# Patient Record
Sex: FEMALE | Race: WHITE | NOT HISPANIC OR LATINO | URBAN - METROPOLITAN AREA
[De-identification: names, ages, dates, MRNs, and addresses within clinical notes are randomized per-mention and may not be internally consistent; named-entity substitution may affect disease eponyms.]

---

## 2016-01-13 LAB — PAP SMEAR - HIM PATIENT REPORTED: NEGATIVE

## 2017-03-27 ENCOUNTER — OFFICE VISIT (OUTPATIENT)
Dept: FAMILY MEDICINE | Facility: CLINIC | Age: 27
End: 2017-03-27

## 2017-03-27 VITALS
WEIGHT: 119 LBS | SYSTOLIC BLOOD PRESSURE: 108 MMHG | DIASTOLIC BLOOD PRESSURE: 69 MMHG | OXYGEN SATURATION: 98 % | HEIGHT: 65 IN | HEART RATE: 58 BPM | BODY MASS INDEX: 19.83 KG/M2 | TEMPERATURE: 97.7 F

## 2017-03-27 DIAGNOSIS — Z30.41 ENCOUNTER FOR SURVEILLANCE OF CONTRACEPTIVE PILLS: ICD-10-CM

## 2017-03-27 DIAGNOSIS — Z00.00 ROUTINE GENERAL MEDICAL EXAMINATION AT A HEALTH CARE FACILITY: Primary | ICD-10-CM

## 2017-03-27 RX ORDER — NORGESTIMATE AND ETHINYL ESTRADIOL 7DAYSX3 28
1 KIT ORAL DAILY
COMMUNITY
End: 2017-03-27

## 2017-03-27 RX ORDER — NORGESTIMATE AND ETHINYL ESTRADIOL 7DAYSX3 28
1 KIT ORAL DAILY
Qty: 84 TABLET | Refills: 3 | Status: SHIPPED | OUTPATIENT
Start: 2017-03-27 | End: 2018-02-08

## 2017-03-27 ASSESSMENT — PAIN SCALES - GENERAL: PAINLEVEL: NO PAIN (0)

## 2017-03-27 NOTE — NURSING NOTE
"    Chief Complaint   Patient presents with     [facility] Care     Physical     27 year old      Blood pressure 108/69, pulse 58, temperature 97.7  F (36.5  C), temperature source Oral, height 5' 5.35\" (166 cm), weight 119 lb (54 kg), SpO2 98 %. Body mass index is 19.59 kg/(m^2).    Wt Readings from Last 2 Encounters:   03/27/17 119 lb (54 kg)     BP Readings from Last 3 Encounters:   03/27/17 108/69       There is no problem list on file for this patient.      Current Outpatient Prescriptions   Medication Sig Dispense Refill     norgestim-eth estrad triphasic (TRI-ESTARYLLA) 0.18/0.215/0.25 MG-35 MCG per tablet Take 1 tablet by mouth daily         Social History   Substance Use Topics     Smoking status: Never Smoker     Smokeless tobacco: Not on file     Alcohol use Yes         Health Maintenance Due   Topic Date Due     HPV IMMUNIZATION (1 of 3 - Female 3 Dose Series) 03/13/2003     TETANUS IMMUNIZATION (SYSTEM ASSIGNED)  03/13/2010       DEEPAK HECK CMA  March 27, 2017 10:15 AM      "

## 2017-03-27 NOTE — MR AVS SNAPSHOT
After Visit Summary   3/27/2017    Lexii Oneill    MRN: [id number]           Patient Information     Date Of Birth          1990        Visit Information        Provider Department      3/27/2017 10:00 AM Elham Garcia PA-C [facility]        Today's Diagnoses     Routine general medical examination at a health care facility    -  1    Screening for malignant neoplasm of cervix        Immunization due        Encounter for surveillance of contraceptive pills          Care Instructions      Preventive Health Recommendations  Female Ages 18 to 25     Yearly exam:     See your health care provider every year in order to  o Review health changes.   o Discuss preventive care.    o Review your medicines if your doctor has prescribed any.      You should be tested each year for STDs (sexually transmitted diseases).       After age 20, talk to your provider about how often you should have cholesterol testing.      Starting at age 21, get a Pap test every three years. If you have an abnormal result, your doctor may have you test more often.      If you are at risk for diabetes, you should have a diabetes test (fasting glucose).     Shots:     Get a flu shot each year.     Get a tetanus shot every 10 years.     Consider getting the shot (vaccine) that prevents cervical cancer (Gardasil).    Nutrition:     Eat at least 5 servings of fruits and vegetables each day.    Eat whole-grain bread, whole-wheat pasta and brown rice instead of white grains and rice.    Talk to your provider about Calcium and Vitamin D.     Lifestyle    Exercise at least 150 minutes a week each week (30 minutes a day, 5 days a week). This will help you control your weight and prevent disease.    Limit alcohol to one drink per day.    No smoking.     Wear sunscreen to prevent skin cancer.    See your dentist every six months for an exam and cleaning.        Follow-ups after your visit        Who to contact     Please call  "your clinic at [phone] to:    Ask questions about your health    Make or cancel appointments    Discuss your medicines    Learn about your test results    Speak to your doctor   If you have compliments or concerns about an experience at your clinic, or if you wish to file a complaint, please contact [facility] Patient Relations at [phone] or email us at [email or web address]         Additional Information About Your Visit        Sara Campbellhart Information     Door 6 is an electronic gateway that provides easy, online access to your medical records. With Door 6, you can request a clinic appointment, read your test results, renew a prescription or communicate with your care team.     To sign up for Door 6 visit the website at [email or web address]   You will be asked to enter the access code listed below, as well as some personal information. Please follow the directions to create your username and password.     Your access code is: [email or web address]  Expires: 2017  7:45 AM     Your access code will  in 90 days. If you need help or a new code, please contact your [facility] or call [phone] for assistance.        Care EveryWhere ID     This is your Care EveryWhere ID. This could be used by other organizations to access your [place] medical records  [id number]        Your Vitals Were     Pulse Temperature Height Pulse Oximetry BMI (Body Mass Index)       58 97.7  F (36.5  C) (Oral) 5' 5.35\" (166 cm) 98% 19.59 kg/m2        Blood Pressure from Last 3 Encounters:   17 108/69    Weight from Last 3 Encounters:   17 119 lb (54 kg)              Today, you had the following     No orders found for display         Where to get your medicines      These medications were sent to [facility] [address] [place] [address] AT [address] [address]    Hours:  24-hours Phone:  " [phone]     norgestim-eth estrad triphasic 0.18/0.215/0.25 MG-35 MCG per tablet          Primary Care Provider Office Phone # Fax #    Elham Garcia PA-C [phone]       [facility] [address] [place] [address]        Thank you!     Thank you for choosing [facility]  for your care. Our goal is always to provide you with excellent care. Hearing back from our patients is one way we can continue to improve our services. Please take a few minutes to complete the written survey that you may receive in the mail after your visit with us. Thank you!             Your Updated Medication List - Protect others around you: Learn how to safely use, store and throw away your medicines at www.disposemymeds.org.          This list is accurate as of: 3/27/17 10:42 AM.  Always use your most recent med list.                   Brand Name Dispense Instructions for use    norgestim-eth estrad triphasic 0.18/0.215/0.25 MG-35 MCG per tablet    TRI-ESTARYLLA    84 tablet    Take 1 tablet by mouth daily

## 2018-02-08 DIAGNOSIS — Z30.41 ENCOUNTER FOR SURVEILLANCE OF CONTRACEPTIVE PILLS: ICD-10-CM

## 2018-02-08 RX ORDER — NORGESTIMATE AND ETHINYL ESTRADIOL 7DAYSX3 28
1 KIT ORAL DAILY
Qty: 84 TABLET | Refills: 0 | Status: SHIPPED | OUTPATIENT
Start: 2018-02-08 | End: 2018-04-30

## 2018-02-08 NOTE — TELEPHONE ENCOUNTER
Called [facility] pharmacy.  She has 1 week left of pills. No refills left.    Refilled per RN protocol.  Patient will be due in clinic on or around 3/27/18.  Placed information on prescription sig.    Leia Pdaron RN, [facility]

## 2018-04-30 DIAGNOSIS — Z30.41 ENCOUNTER FOR SURVEILLANCE OF CONTRACEPTIVE PILLS: ICD-10-CM

## 2018-04-30 RX ORDER — NORGESTIMATE AND ETHINYL ESTRADIOL 7DAYSX3 28
1 KIT ORAL DAILY
Qty: 28 TABLET | Refills: 0 | Status: SHIPPED | OUTPATIENT
Start: 2018-04-30 | End: 2018-05-25

## 2018-04-30 NOTE — TELEPHONE ENCOUNTER
Please call patient to let her know she is due for office visit for a refill on her OCP's.  Also she will be due for PAP 1/2019 and she can make appointment to have this updated as well. I will send one month supply to her pharmacy.  Brianne Garcia PA-C

## 2018-04-30 NOTE — TELEPHONE ENCOUNTER
Called pt and LVM on her mobile.  Told needs appt and number to call for appt and any questions.  Also told when due for PAP.  Pt not on [employer].    Chiara Kapoor RN  April 30, 2018 2:46 PM

## 2018-04-30 NOTE — TELEPHONE ENCOUNTER
Routing refill request to provider for review/approval because:  Samantha given x1 and patient did not follow up, please advise  Patient needs to be seen because it has been more than 1 year since last office visit.  Last seen 3/27/17.    Chiara Kapoor RN  April 30, 2018 2:00 PM

## 2018-05-25 DIAGNOSIS — Z30.41 ENCOUNTER FOR SURVEILLANCE OF CONTRACEPTIVE PILLS: ICD-10-CM

## 2018-05-25 RX ORDER — NORGESTIMATE AND ETHINYL ESTRADIOL 7DAYSX3 28
1 KIT ORAL DAILY
Qty: 28 TABLET | Refills: 0 | Status: SHIPPED | OUTPATIENT
Start: 2018-05-25 | End: 2018-06-26

## 2018-05-25 NOTE — TELEPHONE ENCOUNTER
Last office visit: 03/27/2017, no future appointment. Needs follow-up appointment.    Medication is being filled for 1 time refill only due to:  Patient needs to be seen because it has been more than one year since last visit.

## 2018-06-26 ENCOUNTER — OFFICE VISIT (OUTPATIENT)
Dept: FAMILY MEDICINE | Facility: CLINIC | Age: 28
End: 2018-06-26
Payer: COMMERCIAL

## 2018-06-26 VITALS
TEMPERATURE: 98.7 F | OXYGEN SATURATION: 98 % | HEIGHT: 65 IN | SYSTOLIC BLOOD PRESSURE: 106 MMHG | BODY MASS INDEX: 19.83 KG/M2 | HEART RATE: 83 BPM | DIASTOLIC BLOOD PRESSURE: 68 MMHG | WEIGHT: 119 LBS

## 2018-06-26 DIAGNOSIS — G43.009 MIGRAINE WITHOUT AURA AND WITHOUT STATUS MIGRAINOSUS, NOT INTRACTABLE: Primary | ICD-10-CM

## 2018-06-26 DIAGNOSIS — Z30.41 ENCOUNTER FOR SURVEILLANCE OF CONTRACEPTIVE PILLS: ICD-10-CM

## 2018-06-26 RX ORDER — CETIRIZINE HYDROCHLORIDE 10 MG/1
10 TABLET ORAL PRN
COMMUNITY

## 2018-06-26 RX ORDER — NORGESTIMATE AND ETHINYL ESTRADIOL 7DAYSX3 28
1 KIT ORAL DAILY
Qty: 84 TABLET | Refills: 1 | Status: SHIPPED | OUTPATIENT
Start: 2018-06-26 | End: 2018-12-12

## 2018-06-26 ASSESSMENT — PAIN SCALES - GENERAL: PAINLEVEL: NO PAIN (0)

## 2018-06-26 NOTE — MR AVS SNAPSHOT
After Visit Summary   6/26/2018    Lexii Oneill    MRN: [id number]           Patient Information     Date Of Birth          1990        Visit Information        Provider Department      6/26/2018 4:00 PM Elham Garcia PA-C [facility]        Today's Diagnoses     Migraine without aura and without status migrainosus, not intractable    -  1    Encounter for surveillance of contraceptive pills          Care Instructions    Return for PAP and physical in January 2019.      Keep headache diary.    For now continue on the current birth control pill.    Follow up if you have any worsening or persistent problems or concerns.    Welcome to [facility], where we are committed to the art of inspired primary care.  Thank you for choosing us to be a part of your well-being.    The clinic is open Monday through Friday, 8:00 a.m. - 5:00 p.m., and Saturdays from 8:00 a.m. - 12:00 p.m.  After-hours questions are directed to our 24-hour nurse line, which can be reached by calling the clinic at [phone].  You can also contact the clinic through Ocapi, our online patient portal.  (Please allow 1-2 business days for a response via Ocapi.)  If you are not already enrolled in Ocapi, access instructions are below.    If you need a refill on your prescription, please contact the pharmacy where you filled it, and they will contact our clinic with the details of what is needed.  If your prescription is a controlled substance, you will have a conversation with your provider to determine if you would like to  your prescription at the clinic or have it mailed to your pharmacy.  Please allow 2-3 business days for all refill requests to be handled.    We look forward to providing you with great care!  Please let me know if you have any questions.  Thank you for allowing me to participate in your care.  It was my pleasure meeting you.  Brianne Garcia PA-C          "   Follow-ups after your visit        Who to contact     Please call your clinic at [phone] to:    Ask questions about your health    Make or cancel appointments    Discuss your medicines    Learn about your test results    Speak to your doctor            Additional Information About Your Visit        VoxPop Clothinghart Information     Instant Information is an electronic gateway that provides easy, online access to your medical records. With Instant Information, you can request a clinic appointment, read your test results, renew a prescription or communicate with your care team.     To sign up for Instant Information visit the website at [email or web address]   You will be asked to enter the access code listed below, as well as some personal information. Please follow the directions to create your username and password.     Your access code is: [email or web address]  Expires: 2018  4:36 PM     Your access code will  in 90 days. If you need help or a new code, please contact your [facility] or call [phone] for assistance.        Care EveryWhere ID     This is your Care EveryWhere ID. This could be used by other organizations to access your [place] medical records  [id number]        Your Vitals Were     Pulse Temperature Height Last Period Pulse Oximetry Breastfeeding?    83 98.7  F (37.1  C) (Oral) 5' 5\" (165.1 cm) 2018 (Exact Date) 98% No    BMI (Body Mass Index)                   19.8 kg/m2            Blood Pressure from Last 3 Encounters:   18 106/68   17 108/69    Weight from Last 3 Encounters:   18 119 lb (54 kg)   17 119 lb (54 kg)              We Performed the Following     PAP Smear - HIM Patient Reported          Today's Medication Changes          These changes are accurate as of 18  4:36 PM.  If you have any questions, ask your nurse or doctor.               These medicines have changed or have updated prescriptions.        Dose/Directions    norgestim-eth estrad " triphasic 0.18/0.215/0.25 MG-35 MCG per tablet   Commonly known as:  TRI-ESTARYLLA   This may have changed:  additional instructions   Used for:  Encounter for surveillance of contraceptive pills        Dose:  1 tablet   Take 1 tablet by mouth daily   Quantity:  84 tablet   Refills:  1            Where to get your medicines      These medications were sent to [facility] [address] [place] [address] AT [address] [address]    Hours:  24-hours Phone:  [phone]     norgestim-eth estrad triphasic 0.18/0.215/0.25 MG-35 MCG per tablet                Primary Care Provider Office Phone # Fax #    Elham Garcia PA-C [phone] [address] [place] [address]        Equal Access to Services     CHILO GOMEZ : Melissa cook Sograce, waaxda luqadaha, qaybta kaalmada naldo, camelia hanley . So [facility] [phone].    ATENCIÓN: Si habla español, tiene a munson disposición servicios gratuitos de asistencia lingüística. Samra al [phone].    We comply with applicable federal civil rights laws and [place] laws. We do not discriminate on the basis of race, color, national origin, age, disability, sex, sexual orientation, or gender identity.            Thank you!     Thank you for choosing [facility]  for your care. Our goal is always to provide you with excellent care. Hearing back from our patients is one way we can continue to improve our services. Please take a few minutes to complete the written survey that you may receive in the mail after your visit with us. Thank you!             Your Updated Medication List - Protect others around you: Learn how to safely use, store and throw away your medicines at www.disposemymeds.org.          This list is accurate as of 6/26/18  4:36 PM.  Always use your most recent med list.                   Brand Name Dispense Instructions for use Diagnosis     cetirizine 10 MG tablet    zyrTEC     Take 10 mg by mouth as needed        norgestim-eth estrad triphasic 0.18/0.215/0.25 MG-35 MCG per tablet    TRI-ESTARYLLA    84 tablet    Take 1 tablet by mouth daily    Encounter for surveillance of contraceptive pills

## 2018-06-26 NOTE — NURSING NOTE
"28 year old  Chief Complaint   Patient presents with     Refill Request     refill birth control       Blood pressure 106/68, pulse 83, temperature 98.7  F (37.1  C), temperature source Oral, height 5' 5\" (165.1 cm), weight 119 lb (54 kg), last menstrual period 05/30/2018, SpO2 98 %, not currently breastfeeding. Body mass index is 19.8 kg/(m^2).  There is no problem list on file for this patient.      Wt Readings from Last 2 Encounters:   06/26/18 119 lb (54 kg)   03/27/17 119 lb (54 kg)     BP Readings from Last 3 Encounters:   06/26/18 106/68   03/27/17 108/69         Current Outpatient Prescriptions   Medication     cetirizine (ZYRTEC) 10 MG tablet     norgestim-eth estrad triphasic (TRI-ESTARYLLA) 0.18/0.215/0.25 MG-35 MCG per tablet     No current facility-administered medications for this visit.        Social History   Substance Use Topics     Smoking status: Never Smoker     Smokeless tobacco: Not on file     Alcohol use Yes      Comment: 6 per week       Health Maintenance Due   Topic Date Due     HIV SCREEN (SYSTEM ASSIGNED)  03/13/2010     PHQ-2 Q1 YR  03/27/2018       No results found for: SARA LundbergPTraeNTrae  June 26, 2018 4:17 PM    "

## 2018-06-26 NOTE — PROGRESS NOTES
SUBJECTIVE:   Lexii Oneill is a 28 year old female who presents to clinic today for the following health issues:      Migraine Follow-Up:  Never associated with aura.     Headaches symptoms:  Stable     Frequency: once monthly     Duration of headaches: 4 hours to 1 day    Able to do normal daily activities/work with migraines: Yes    Rescue/Relief medication:ibuprofen (Advil, Motrin)              Effectiveness: moderate relief    Preventative medication: None    Neurologic complications: No new stroke-like symptoms, loss of vision or speech, numbness or weakness    In the past 4 weeks, how often have you gone to Urgent Care or the emergency room because of your headaches?  0      Amount of exercise or physical activity: 2-3 days/week for an average of 45-60 minutes    Problems taking medications regularly: No    Medication side effects: none    Diet: regular (no restrictions)        Contraception:   Needs a refill of OCP. She will be due for PAP in 6 months. She denies vaginal symptoms and has no concerns for STD's.  She declines screening.      Problem list and histories reviewed & adjusted, as indicated.  Additional history: as documented    Patient Active Problem List   Diagnosis     Migraine without aura and without status migrainosus, not intractable     Encounter for surveillance of contraceptive pills     Past Surgical History:   Procedure Laterality Date     SINUS SURGERY      Polyps removed     wisdom teeth         Social History   Substance Use Topics     Smoking status: Never Smoker     Smokeless tobacco: Not on file     Alcohol use Yes      Comment: 6 per week     Family History   Problem Relation Age of Onset     Lung Cancer Paternal Grandfather      smoker     Lung Cancer Paternal Uncle      Smoker     Diabetes No family hx of      Coronary Artery Disease No family hx of      Hypertension No family hx of      Abdominal Aortic Aneurysm No family hx of          Current Outpatient Prescriptions  "  Medication Sig Dispense Refill     cetirizine (ZYRTEC) 10 MG tablet Take 10 mg by mouth as needed       norgestim-eth estrad triphasic (TRI-ESTARYLLA) 0.18/0.215/0.25 MG-35 MCG per tablet Take 1 tablet by mouth daily 84 tablet 1     Allergies   Allergen Reactions     Bactrim [Sulfamethoxazole W/Trimethoprim]      Seasonal Allergies        Reviewed and updated as needed this visit by clinical staff  Allergies  Meds  Problems       Reviewed and updated as needed this visit by Provider  Allergies  Meds  Problems         ROS:  Constitutional, HEENT, cardiovascular, pulmonary, gi and gu systems are negative, except as otherwise noted.    OBJECTIVE:     /68 (BP Location: Left arm, Patient Position: Sitting, Cuff Size: Adult Regular)  Pulse 83  Temp 98.7  F (37.1  C) (Oral)  Ht 5' 5\" (165.1 cm)  Wt 119 lb (54 kg)  LMP 05/30/2018 (Exact Date)  SpO2 98%  Breastfeeding? No  BMI 19.8 kg/m2  Body mass index is 19.8 kg/(m^2).  GENERAL: healthy, alert and no distress  NECK: no adenopathy, no asymmetry, masses, or scars and thyroid normal to palpation  RESP: lungs clear to auscultation - no rales, rhonchi or wheezes  CV: regular rate and rhythm, normal S1 S2, no S3 or S4, no murmur, click or rub, no peripheral edema and peripheral pulses strong  ABDOMEN: soft, nontender, no hepatosplenomegaly, no masses and bowel sounds normal  SKIN: no suspicious lesions or rashes  PSYCH: mentation appears normal, affect normal/bright      ASSESSMENT/PLAN:         ICD-10-CM    1. Migraine without aura and without status migrainosus, not intractable G43.009    2. Encounter for surveillance of contraceptive pills Z30.41 norgestim-eth estrad triphasic (TRI-ESTARYLLA) 0.18/0.215/0.25 MG-35 MCG per tablet       Patient Instructions   Return for PAP and physical in January 2019.      Keep headache diary.    For now continue on the current birth control pill.    Follow up if you have any worsening or persistent problems or " concerns.    Welcome to [facility], where we are committed to the art of inspired primary care.  Thank you for choosing us to be a part of your well-being.    The clinic is open Monday through Friday, 8:00 a.m. - 5:00 p.m., and Saturdays from 8:00 a.m. - 12:00 p.m.  After-hours questions are directed to our 24-hour nurse line, which can be reached by calling the clinic at [phone].  You can also contact the clinic through [employer], our online patient portal.  (Please allow 1-2 business days for a response via [employer].)  If you are not already enrolled in [employer], access instructions are below.    If you need a refill on your prescription, please contact the pharmacy where you filled it, and they will contact our clinic with the details of what is needed.  If your prescription is a controlled substance, you will have a conversation with your provider to determine if you would like to  your prescription at the clinic or have it mailed to your pharmacy.  Please allow 2-3 business days for all refill requests to be handled.    We look forward to providing you with great care!  Please let me know if you have any questions.  Thank you for allowing me to participate in your care.  It was my pleasure meeting you.  Brianne Garcia PA-C  [facility]

## 2018-06-26 NOTE — PATIENT INSTRUCTIONS
Return for PAP and physical in January 2019.      Keep headache diary.    For now continue on the current birth control pill.    Follow up if you have any worsening or persistent problems or concerns.    Welcome to [facility], where we are committed to the art of inspired primary care.  Thank you for choosing us to be a part of your well-being.    The clinic is open Monday through Friday, 8:00 a.m. - 5:00 p.m., and Saturdays from 8:00 a.m. - 12:00 p.m.  After-hours questions are directed to our 24-hour nurse line, which can be reached by calling the clinic at [phone].  You can also contact the clinic through [employer], our online patient portal.  (Please allow 1-2 business days for a response via MoonClerk.)  If you are not already enrolled in [employer], access instructions are below.    If you need a refill on your prescription, please contact the pharmacy where you filled it, and they will contact our clinic with the details of what is needed.  If your prescription is a controlled substance, you will have a conversation with your provider to determine if you would like to  your prescription at the clinic or have it mailed to your pharmacy.  Please allow 2-3 business days for all refill requests to be handled.    We look forward to providing you with great care!  Please let me know if you have any questions.  Thank you for allowing me to participate in your care.  It was my pleasure meeting you.  Brianne Garcia PA-C

## 2018-06-28 PROBLEM — Z30.41 ENCOUNTER FOR SURVEILLANCE OF CONTRACEPTIVE PILLS: Status: ACTIVE | Noted: 2018-06-28

## 2018-06-28 PROBLEM — G43.009 MIGRAINE WITHOUT AURA AND WITHOUT STATUS MIGRAINOSUS, NOT INTRACTABLE: Status: ACTIVE | Noted: 2018-06-28

## 2018-12-12 DIAGNOSIS — Z30.41 ENCOUNTER FOR SURVEILLANCE OF CONTRACEPTIVE PILLS: ICD-10-CM

## 2018-12-12 RX ORDER — NORGESTIMATE AND ETHINYL ESTRADIOL 7DAYSX3 28
1 KIT ORAL DAILY
Qty: 84 TABLET | Refills: 0 | Status: SHIPPED | OUTPATIENT
Start: 2018-12-12 | End: 2019-03-06

## 2018-12-12 NOTE — TELEPHONE ENCOUNTER
Last seen 6/26/18    Prescription approved per [facility] Refill Protocol.    Will be due for PAP Jan 2019  Will only give one 3 month supply.      Chiara Kapoor RN  December 12, 2018 5:19 PM          .

## 2018-12-22 NOTE — PROGRESS NOTES
SUBJECTIVE:     CC: Lexii Oneill is an 27 year old woman who presents for preventive health visit. Last physical 1/2016.  She would like to renew her birth control pills.  Has been on for several years since the age of 16.  She ahs not problems with them      Healthy Habits:    Do you get at least three servings of calcium containing foods daily (dairy, green leafy vegetables, etc.)? yes    Amount of exercise or daily activities, outside of work: 3 day(s) per week, Strength training.     Problems taking medications regularly No    Medication side effects: No    Have you had an eye exam in the past two years? yes    Do you see a dentist twice per year? yes    Do you have sleep apnea, excessive snoring or daytime drowsiness?no    Care Everywherre reviewed. Health Maintenance updated.      Today's PHQ-2 Score:   PHQ-2 ( 1999 [employer]) 3/27/2017   Q1: Little interest or pleasure in doing things 0   Q2: Feeling down, depressed or hopeless 0   PHQ-2 Score 0       Social History   Substance Use Topics     Smoking status: Never Smoker     Smokeless tobacco: Not on file     Alcohol use Yes      Comment: 6 per week     The patient does not drink >3 drinks per day nor >7 drinks per week.    Reviewed orders with patient.  Reviewed health maintenance and updated orders accordingly - Yes    History of abnormal Pap smear: NO - age 21-29 PAP every 3 years recommended    Reviewed and updated as needed this visit by clinical staff  Tobacco  Allergies  Meds  Problems  Med Hx  Surg Hx  Fam Hx  Soc Hx        Reviewed and updated as needed this visit by Provider  Tobacco  Allergies  Meds  Problems  Med Hx  Surg Hx  Fam Hx  Soc Hx         There are no active problems to display for this patient.      Past Medical History:   Diagnosis Date     Seasonal allergies      Vasomotor rhinitis        Past Surgical History:   Procedure Laterality Date     SINUS SURGERY      Polyps removed     wisdom teeth         Family History    Problem Relation Age of Onset     Lung Cancer Paternal Grandfather      smoker     Lung Cancer Paternal Uncle      Smoker     DIABETES No family hx of      Coronary Artery Disease No family hx of      Hypertension No family hx of      Abdominal Aortic Aneurysm No family hx of        Social History   Substance Use Topics     Smoking status: Never Smoker     Smokeless tobacco: Not on file     Alcohol use Yes      Comment: 6 per week       Social History     Social History Narrative    Lives with roommate. Lives with a cat.    Feels safe in environments.    Has a good support network.      Grew up in [place] Moved to [place] 6 years ago.    Has a significant other.    Denies concerns for abuse  Past or present, physical, sexual and emotional.    Brianne Garcia PA-C    03/27/17                   Current Outpatient Prescriptions   Medication Sig Dispense Refill     norgestim-eth estrad triphasic (TRI-ESTARYLLA) 0.18/0.215/0.25 MG-35 MCG per tablet Take 1 tablet by mouth daily 84 tablet 3     [DISCONTINUED] norgestim-eth estrad triphasic (TRI-ESTARYLLA) 0.18/0.215/0.25 MG-35 MCG per tablet Take 1 tablet by mouth daily           ROS:  C: NEGATIVE for fever, chills, change in weight  I: NEGATIVE for worrisome rashes, moles or lesions  E: NEGATIVE for vision changes or irritation  ENT: NEGATIVE for ear, mouth and throat problems  R: NEGATIVE for significant cough or SOB  B: NEGATIVE for masses, tenderness or discharge  CV: NEGATIVE for chest pain, palpitations or peripheral edema  GI: NEGATIVE for nausea, abdominal pain, heartburn, or change in bowel habits  : NEGATIVE for unusual urinary or vaginal symptoms. Periods are regular.  M: NEGATIVE for significant arthralgias or myalgia  N: NEGATIVE for weakness, dizziness or paresthesias  E: NEGATIVE for temperature intolerance, skin/hair changes  H: NEGATIVE for bleeding problems  P: NEGATIVE for changes in mood or affect    OBJECTIVE:     /69  Pulse 58  Temp  "97.7  F (36.5  C) (Oral)  Ht 5' 5.35\" (166 cm)  Wt 119 lb (54 kg)  SpO2 98%  BMI 19.59 kg/m2  EXAM:  GENERAL: healthy, alert and no distress  EYES: Eyes grossly normal to inspection, PERRL and conjunctivae and sclerae normal  HENT: ear canals and TM's normal, nose and mouth without ulcers or lesions  NECK: no adenopathy, no asymmetry, masses, or scars and thyroid normal to palpation  RESP: lungs clear to auscultation - no rales, rhonchi or wheezes  BREAST: normal without masses, tenderness or nipple discharge and no palpable axillary masses or adenopathy  CV: regular rate and rhythm, normal S1 S2, no S3 or S4, no murmur, click or rub, no peripheral edema and peripheral pulses strong  ABDOMEN: soft, nontender, no hepatosplenomegaly, no masses and bowel sounds normal  MS: no gross musculoskeletal defects noted, no edema  SKIN: no suspicious lesions or rashes  NEURO: Normal strength and tone, mentation intact and speech normal  PSYCH: mentation appears normal, affect normal/bright  LYMPH: no cervical, supraclavicular, axillary, or inguinal adenopathy    ASSESSMENT/PLAN:         ICD-10-CM    1. Routine general medical examination at a health care facility Z00.00    2. Encounter for surveillance of contraceptive pills Z30.41 norgestim-eth estrad triphasic (TRI-ESTARYLLA) 0.18/0.215/0.25 MG-35 MCG per tablet       COUNSELING:   Special attention given to:        Regular exercise       Healthy diet/nutrition       Vision screening       Contraception       Osteoporosis Prevention/Bone Health       Safe sex practices/STD prevention       HIV screeninx in teen years, 1x in adult years, and at intervals if high risk  STD testing offered and declined.       reports that she has never smoked. She does not have any smokeless tobacco history on file.    Estimated body mass index is 19.59 kg/(m^2) as calculated from the following:    Height as of this encounter: 5' 5.35\" (166 cm).    Weight as of this encounter: 119 lb (54 " kg).       Counseling Resources:  ATP IV Guidelines  Pooled Cohorts Equation Calculator  Breast Cancer Risk Calculator  FRAX Risk Assessment  ICSI Preventive Guidelines  Dietary Guidelines for Americans, 2010  USDA's MyPlate  ASA Prophylaxis  Lung CA Screening    Elham Garcia PA-C  [facility]   Fall with Harm Risk

## 2019-03-06 DIAGNOSIS — Z30.41 ENCOUNTER FOR SURVEILLANCE OF CONTRACEPTIVE PILLS: ICD-10-CM

## 2019-03-06 RX ORDER — NORGESTIMATE AND ETHINYL ESTRADIOL 7DAYSX3 28
1 KIT ORAL DAILY
Qty: 84 TABLET | Refills: 1 | Status: SHIPPED | OUTPATIENT
Start: 2019-03-06 | End: 2019-08-16

## 2019-03-06 NOTE — TELEPHONE ENCOUNTER
Last office visit was on 06/26/2018, no future visits scheduled.    Prescription approved per [facility] Refill Protocol.  Pat Mccormick RN  [facility]

## 2019-08-16 DIAGNOSIS — Z30.41 ENCOUNTER FOR SURVEILLANCE OF CONTRACEPTIVE PILLS: ICD-10-CM

## 2019-08-16 RX ORDER — NORGESTIMATE AND ETHINYL ESTRADIOL 7DAYSX3 28
1 KIT ORAL DAILY
Qty: 84 TABLET | Refills: 0 | Status: SHIPPED | OUTPATIENT
Start: 2019-08-16 | End: 2019-11-13

## 2019-11-12 DIAGNOSIS — Z30.41 ENCOUNTER FOR SURVEILLANCE OF CONTRACEPTIVE PILLS: ICD-10-CM

## 2019-11-12 NOTE — TELEPHONE ENCOUNTER
Last time prescribed: 8/16/19 , 84 tabs x 0 refills  Last office visit: 6/26/18  Next appointment: No future appointments    Spoke to patient and she has a new clinic closer to her home - she has first appointment there next week. She appreciates a 30 day supply for now to see her until the appointment.     Hailee Mccall RN  11/13/19  11:20 AM

## 2019-11-13 RX ORDER — NORGESTIMATE AND ETHINYL ESTRADIOL 7DAYSX3 28
1 KIT ORAL DAILY
Qty: 28 TABLET | Refills: 0 | Status: SHIPPED | OUTPATIENT
Start: 2019-11-13